# Patient Record
Sex: MALE | Race: WHITE
[De-identification: names, ages, dates, MRNs, and addresses within clinical notes are randomized per-mention and may not be internally consistent; named-entity substitution may affect disease eponyms.]

---

## 2022-09-06 ENCOUNTER — NON-APPOINTMENT (OUTPATIENT)
Age: 52
End: 2022-09-06

## 2022-09-06 PROBLEM — Z00.00 ENCOUNTER FOR PREVENTIVE HEALTH EXAMINATION: Status: ACTIVE | Noted: 2022-09-06

## 2022-09-07 ENCOUNTER — NON-APPOINTMENT (OUTPATIENT)
Age: 52
End: 2022-09-07

## 2022-09-07 ENCOUNTER — APPOINTMENT (OUTPATIENT)
Dept: COLORECTAL SURGERY | Facility: CLINIC | Age: 52
End: 2022-09-07

## 2022-09-07 VITALS
TEMPERATURE: 36.2 F | BODY MASS INDEX: 22.73 KG/M2 | HEART RATE: 78 BPM | DIASTOLIC BLOOD PRESSURE: 73 MMHG | WEIGHT: 150 LBS | SYSTOLIC BLOOD PRESSURE: 139 MMHG | HEIGHT: 68 IN

## 2022-09-07 DIAGNOSIS — K64.8 OTHER HEMORRHOIDS: ICD-10-CM

## 2022-09-07 PROCEDURE — 99202 OFFICE O/P NEW SF 15 MIN: CPT

## 2022-09-07 NOTE — ASSESSMENT
[FreeTextEntry1] : Thrombosed external hemorrhoid.\par \par Recommend conservative management.  Topical anesthetics and sitz bath's.  Avoid constipation.\par \par Recommend patient pursue colonoscopy after recovery from this acute thrombosed hemorrhoid for screening and surveillance of possible colorectal Cancer and polyps.

## 2022-09-07 NOTE — HISTORY OF PRESENT ILLNESS
[FreeTextEntry1] : 52 y/o M presents for evaluation of possible hemorrhoids\par Denies any PSH\par \par Last week developed onset of hemorrhoids w/o obvious aggravating factor. Admits to nontender swollen tissue w/ surrounding irritated tissue that burns. PCP started on cortisone cream which he used for 3-4 days w/o improvement\par Denies fever, chills, itching, bleeding or discharge\par \par BH: 1-2 in morning, occasional once in the evening. Admits to occasional scant straining. Pt cleans area w/ soft tissues or wet tissue. Admits to meticulously cleaning area\par Admits could improve intake of dietary fiber and water\par Denies use of stool softeners or fiber supplements\par \par Denies colonoscopy\par Denies [facility] CRC. MGM had colitis\par Denies ASA/NSAIDs in last 7 days\par \par \par \par \par \par \par Denies any [facility] of CRC / IBD

## 2022-09-07 NOTE — PHYSICAL EXAM
[Excoriation] : no perianal excoriation [Tender, Swollen] : tender, swollen [Thrombosed] : that was thrombosed [Normal] : was normal [None] : there was no rectal mass  [de-identified] : right anterior thrombosed external hemorrhoid